# Patient Record
Sex: FEMALE | Race: WHITE | NOT HISPANIC OR LATINO | Employment: FULL TIME | URBAN - METROPOLITAN AREA
[De-identification: names, ages, dates, MRNs, and addresses within clinical notes are randomized per-mention and may not be internally consistent; named-entity substitution may affect disease eponyms.]

---

## 2017-01-13 ENCOUNTER — HOSPITAL ENCOUNTER (EMERGENCY)
Facility: CLINIC | Age: 32
Discharge: HOME | End: 2017-01-13
Attending: EMERGENCY MEDICINE | Admitting: EMERGENCY MEDICINE
Payer: COMMERCIAL

## 2017-01-13 VITALS
SYSTOLIC BLOOD PRESSURE: 108 MMHG | TEMPERATURE: 98.2 F | HEART RATE: 67 BPM | OXYGEN SATURATION: 99 % | WEIGHT: 135 LBS | RESPIRATION RATE: 16 BRPM | BODY MASS INDEX: 23.92 KG/M2 | HEIGHT: 63 IN | DIASTOLIC BLOOD PRESSURE: 53 MMHG

## 2017-01-13 DIAGNOSIS — S05.01XA CORNEAL ABRASION, RIGHT, INITIAL ENCOUNTER: ICD-10-CM

## 2017-01-13 PROCEDURE — 99283 EMERGENCY DEPT VISIT LOW MDM: CPT

## 2017-01-13 PROCEDURE — 25000125 ZZHC RX 250: Performed by: EMERGENCY MEDICINE

## 2017-01-13 RX ORDER — TOBRAMYCIN 3 MG/ML
1 SOLUTION/ DROPS OPHTHALMIC EVERY 4 HOURS
Qty: 1 BOTTLE | Refills: 0 | Status: SHIPPED | OUTPATIENT
Start: 2017-01-13 | End: 2017-01-20

## 2017-01-13 RX ORDER — PROPARACAINE HYDROCHLORIDE 5 MG/ML
2 SOLUTION/ DROPS OPHTHALMIC ONCE
Status: COMPLETED | OUTPATIENT
Start: 2017-01-13 | End: 2017-01-13

## 2017-01-13 RX ADMIN — PROPARACAINE HYDROCHLORIDE 2 DROP: 5 SOLUTION/ DROPS OPHTHALMIC at 08:06

## 2017-01-13 ASSESSMENT — VISUAL ACUITY
OS: 20/15
OD: 20/20

## 2017-01-13 ASSESSMENT — ENCOUNTER SYMPTOMS
EYE REDNESS: 1
PHOTOPHOBIA: 1
EYE PAIN: 1

## 2017-01-13 NOTE — ED AVS SNAPSHOT
Emergency Department    [address] [facility] [address]    Phone:  [phone]    Fax:  [phone]                                       Natacha Martin   MRN: [id number]    Department:   Emergency Department   Date of Visit:  1/13/2017           After Visit Summary Signature Page     I have received my discharge instructions, and my questions have been answered. I have discussed any challenges I see with this plan with the nurse or doctor.    ..........................................................................................................................................  Patient/Patient Representative Signature      ..........................................................................................................................................  Patient Representative Print Name and Relationship to Patient    ..................................................               ................................................  Date                                            Time    ..........................................................................................................................................  Reviewed by Signature/Title    ...................................................              ..............................................  Date                                                            Time

## 2017-01-13 NOTE — DISCHARGE INSTRUCTIONS
Discharge Instructions  Corneal Abrasion    Today you were treated for a scratch on the cornea of your eye, or a corneal abrasion.  The cornea is the clear layer of tissue that covers the colored part of your eye. Corneal abrasions are caused when something scratches your eye such as fingernails, animal paws, branches, pieces of paper, tiny pieces of rust, wood, glass, plastic or contact lenses. Corneal abrasions often make people feel like there is a speck of sand in the eye; these abrasions also can cause severe eye pain, watery eyes, blurred vision and pain with bright light.      Treatment:    Tylenol  (acetaminophen), Motrin  (ibuprofen), or Advil  (ibuprofen) will help with the pain from the abrasion.      Use the antibiotic eye ointment or drops as directed until the antibiotics are finished.    Do not wear contacts until antibiotic is finished.    Do not patch your eye, this can increase your risk for infection.  Your symptoms should improve gradually over the next 2 days, if they are not improving, it is very important that you see an eye doctor right away.  If over the next few days, the pain is getting worse, you have increasing difficulty with vision or you have yellow drainage from your eye, you need to see the eye doctor that day.  If you have difficulty getting in to see an eye doctor, please return to an Urgent Care or Emergency Department for further evaluation and treatment.    If you were given a prescription for medicine here today, be sure to read all of the information (including the package insert) that comes with your prescription.  This will include important information about the medicine, its side effects, and any warnings that you need to know about.  The pharmacist who fills the prescription can provide more information and answer questions you may have about the medicine.  If you have questions or concerns that the pharmacist cannot address, please call or return to the Emergency  Department.   Opioid Medication Information    Pain medications are among the most commonly prescribed medicines, so we are including this information for all our patients. If you did not receive pain medication or get a prescription for pain medicine, you can ignore it.     You may have been given a prescription for an opioid (narcotic) pain medicine and/or have received a pain medicine while here in the Emergency Department. These medicines can make you drowsy or impaired. You must not drive, operate dangerous equipment, or engage in any other dangerous activities while taking these medications. If you drive while taking these medications, you could be arrested for DUI, or driving under the influence. Do not drink any alcohol while you are taking these medications.     Opioid pain medications can cause addiction. If you have a history of chemical dependency of any type, you are at a higher risk of becoming addicted to pain medications.  Only take these prescribed medications to treat your pain when all other options have been tried. Take it for as short a time and as few doses as possible. Store your pain pills in a secure place, as they are frequently stolen and provide a dangerous opportunity for children or visitors in your house to start abusing these powerful medications. We will not replace any lost or stolen medicine.  As soon as your pain is better, you should flush all your remaining medication.     Many prescription pain medications contain Tylenol  (acetaminophen), including Vicodin , Tylenol #3 , Norco , Lortab , and Percocet .  You should not take any extra pills of Tylenol  if you are using these prescription medications or you can get very sick.  Do not ever take more than 3000 mg of acetaminophen in any 24 hour period.    All opioids tend to cause constipation. Drink plenty of water and eat foods that have a lot of fiber, such as fruits, vegetables, prune juice, apple juice and high fiber cereal.   Take a laxative if you don t move your bowels at least every other day. Miralax , Milk of Magnesia, Colace , or Senna  can be used to keep you regular.      Remember that you can always come back to the Emergency Department if you are not able to see your regular doctor in the amount of time listed above, if you get any new symptoms, or if there is anything that worries you.

## 2017-01-13 NOTE — ED PROVIDER NOTES
"  History     Chief Complaint:  Eye pain     HPI   Natacha Martin is a 31 year old female who presents for evaluation of eye pain. The patient states that she woke up yesterday morning and noticed that the light in her bathroom was \"really bright\", so she had to dim them. When she looked in the mirror at her eyes, she saw that her right eye was red and irritated. In the interim, she states that she has had excess tearing. Today, the patient woke up with pain in her right eye. She also thinks that the area surrounding her right eye looks swollen. She denies any contact use. She denies any injury to the right eye. She denies any activities that may have resulted in a foreign body getting into her eye.    Allergies:  Penicillins      Medications:    IUD     Past Medical History:    Anxiety  Alcohol abuse, in remission     Past Surgical History:    History reviewed. No pertinent past surgical history.    Family History:    Depression (mother, brother, sister)  [id number] cancer (father)     Social History:  Presents to the ED with her spouse  Tobacco Use: former smoker  Alcohol Use: no  PCP: Lalitha Hanley     Review of Systems   Eyes: Positive for photophobia, pain and redness.     10 point review of systems performed and is negative except as above and in HPI.      Physical Exam   First Vitals:  BP: 108/53 mmHg  Pulse: 67  Temp: 98.2  F (36.8  C)  Resp: 16  Height: 160 cm (5' 3\")  Weight: 61.236 kg (135 lb)  SpO2: 99 %      Physical Exam  General: Sitting on gurney, appears minimally uncomfortable  Head:  No evidence of trauma  Eyes:  1 mm area of increased fluorescein uptake inferior to the pupil in the right eye. She has   conjunctival injection without exudate or mattering. No dendritis or evidence of foreign   body.   Respiratory:  Regular rate without increased respiratory effort  Extremities:  No evidence of injury  Neuro:  Uses all extremities equally. Normal gait.  Psych:  Alert. Appropriate interactions. "       Emergency Department Course     Interventions:  (0806) Alcaine, 2 drops, right eye    Emergency Department Course:  Nursing notes and vitals reviewed.  I performed an exam of the patient as documented above. GCS 15.    Findings and plan explained to the patient. Patient discharged home with instructions regarding supportive care, medications, and reasons to return. The importance of close follow-up was reviewed. The patient was prescribed tobrex.      Impression & Plan      Medical Decision Making:  Natacha Martin is a 31 year old female who presents for evaluation of eye pain. A broad differential diagnosis was considered including bacterial conjunctivitis, viral conjunctivitis, foreign body, corneal abrasion, chemical vs allergic conjunctivitis, corneal ulcer, HSV, herpes zoster opthalmicus, endopthalmitis, orbital cellulitis, etc.  Exam is consistent with a corneal abrasion. The patient was treated with antibiotics.  Instructed to return for visual acuity changes, fever, orbital swelling or any worsening.      Diagnosis:    ICD-10-CM    1. Corneal abrasion, right, initial encounter S05.01XA        Disposition:  discharged to home      Discharge Medication List as of 1/13/2017  9:03 AM      START taking these medications    Details   tobramycin (TOBREX) 0.3 % ophthalmic solution Apply 1 drop to eye every 4 hours for 7 days, Disp-1 Bottle, R-0, Local Print           Mauri TRINIDAD, am serving as a scribe on 1/13/2017 at 8:13 AM to personally document services performed by Dr. Hubert MD based on my observations and the provider's statements to me.     1/13/2017    EMERGENCY DEPARTMENT        Kimberli Gaspar MD  01/14/17 0841

## 2017-01-13 NOTE — ED AVS SNAPSHOT
Emergency Department    [address] [facility] [address]    Phone:  [phone]    Fax:  [phone]                                       Natacha Martin   MRN: [id number]    Department:   Emergency Department   Date of Visit:  1/13/2017           Patient Information     Date Of Birth          1985        Your diagnoses for this visit were:     Corneal abrasion, right, initial encounter        You were seen by Kimberli Gaspar MD.      Follow-up Information     Follow up with Moe Dsouza MD. Schedule an appointment as soon as possible for a visit in 3 days.    Specialty:  Ophthalmology    Contact information:    [facility]  [address]  [facility] [address] [phone]          Follow up with  Emergency Department.    Specialty:  EMERGENCY MEDICINE    Why:  As needed    Contact information:    [address] [facility] [address] [phone]        Discharge Instructions       Discharge Instructions  Corneal Abrasion    Today you were treated for a scratch on the cornea of your eye, or a corneal abrasion.  The cornea is the clear layer of tissue that covers the colored part of your eye. Corneal abrasions are caused when something scratches your eye such as fingernails, animal paws, branches, pieces of paper, tiny pieces of rust, wood, glass, plastic or contact lenses. Corneal abrasions often make people feel like there is a speck of sand in the eye; these abrasions also can cause severe eye pain, watery eyes, blurred vision and pain with bright light.      Treatment:    Tylenol  (acetaminophen), Motrin  (ibuprofen), or Advil  (ibuprofen) will help with the pain from the abrasion.      Use the antibiotic eye ointment or drops as directed until the antibiotics are finished.    Do not wear contacts until antibiotic is finished.    Do not patch your eye, this can increase your risk for infection.  Your symptoms should improve gradually  over the next 2 days, if they are not improving, it is very important that you see an eye doctor right away.  If over the next few days, the pain is getting worse, you have increasing difficulty with vision or you have yellow drainage from your eye, you need to see the eye doctor that day.  If you have difficulty getting in to see an eye doctor, please return to an Urgent Care or Emergency Department for further evaluation and treatment.    If you were given a prescription for medicine here today, be sure to read all of the information (including the package insert) that comes with your prescription.  This will include important information about the medicine, its side effects, and any warnings that you need to know about.  The pharmacist who fills the prescription can provide more information and answer questions you may have about the medicine.  If you have questions or concerns that the pharmacist cannot address, please call or return to the Emergency Department.   Opioid Medication Information    Pain medications are among the most commonly prescribed medicines, so we are including this information for all our patients. If you did not receive pain medication or get a prescription for pain medicine, you can ignore it.     You may have been given a prescription for an opioid (narcotic) pain medicine and/or have received a pain medicine while here in the Emergency Department. These medicines can make you drowsy or impaired. You must not drive, operate dangerous equipment, or engage in any other dangerous activities while taking these medications. If you drive while taking these medications, you could be arrested for DUI, or driving under the influence. Do not drink any alcohol while you are taking these medications.     Opioid pain medications can cause addiction. If you have a history of chemical dependency of any type, you are at a higher risk of becoming addicted to pain medications.  Only take these prescribed  medications to treat your pain when all other options have been tried. Take it for as short a time and as few doses as possible. Store your pain pills in a secure place, as they are frequently stolen and provide a dangerous opportunity for children or visitors in your house to start abusing these powerful medications. We will not replace any lost or stolen medicine.  As soon as your pain is better, you should flush all your remaining medication.     Many prescription pain medications contain Tylenol  (acetaminophen), including Vicodin , Tylenol #3 , Norco , Lortab , and Percocet .  You should not take any extra pills of Tylenol  if you are using these prescription medications or you can get very sick.  Do not ever take more than 3000 mg of acetaminophen in any 24 hour period.    All opioids tend to cause constipation. Drink plenty of water and eat foods that have a lot of fiber, such as fruits, vegetables, prune juice, apple juice and high fiber cereal.  Take a laxative if you don t move your bowels at least every other day. Miralax , Milk of Magnesia, Colace , or Senna  can be used to keep you regular.      Remember that you can always come back to the Emergency Department if you are not able to see your regular doctor in the amount of time listed above, if you get any new symptoms, or if there is anything that worries you.        24 Hour Appointment Hotline       To make an appointment at any [facility], call [phone] ([phone]). If you don't have a family doctor or clinic, we will help you find one. [facility] clinics are conveniently located to serve the needs of you and your family.             Review of your medicines      START taking        Dose / Directions Last dose taken    tobramycin 0.3 % ophthalmic solution   Commonly known as:  TOBREX   Dose:  1 drop   Quantity:  1 Bottle        Apply 1 drop to eye every 4 hours for 7 days   Refills:  0          Our records show that you are taking the  medicines listed below. If these are incorrect, please call your family doctor or clinic.        Dose / Directions Last dose taken    IUD's Iud        by Intrauterine route.   Refills:  0                Prescriptions were sent or printed at these locations (1 Prescription)                   Other Prescriptions                Printed at Department/Unit printer (1 of 1)         tobramycin (TOBREX) 0.3 % ophthalmic solution                Orders Needing Specimen Collection     None      Pending Results     No orders found from 1/12/2017 to 1/14/2017.            Pending Culture Results     No orders found from 1/12/2017 to 1/14/2017.             Test Results from your hospital stay            Clinical Quality Measure: Blood Pressure Screening     Your blood pressure was checked while you were in the emergency department today. The last reading we obtained was  BP: 108/53 mmHg . Please read the guidelines below about what these numbers mean and what you should do about them.  If your systolic blood pressure (the top number) is less than 120 and your diastolic blood pressure (the bottom number) is less than 80, then your blood pressure is normal. There is nothing more that you need to do about it.  If your systolic blood pressure (the top number) is 120-139 or your diastolic blood pressure (the bottom number) is 80-89, your blood pressure may be higher than it should be. You should have your blood pressure rechecked within a year by a primary care provider.  If your systolic blood pressure (the top number) is 140 or greater or your diastolic blood pressure (the bottom number) is 90 or greater, you may have high blood pressure. High blood pressure is treatable, but if left untreated over time it can put you at risk for heart attack, stroke, or kidney failure. You should have your blood pressure rechecked by a primary care provider within the next 4 weeks.  If your provider in the emergency department today gave you  "specific instructions to follow-up with your doctor or provider even sooner than that, you should follow that instruction and not wait for up to 4 weeks for your follow-up visit.        Thank you for choosing [facility]       Thank you for choosing [facility] for your care. Our goal is always to provide you with excellent care. Hearing back from our patients is one way we can continue to improve our services. Please take a few minutes to complete the written survey that you may receive in the mail after you visit with us. Thank you!        [employer] Information     [employer] lets you send messages to your doctor, view your test results, renew your prescriptions, schedule appointments and more. To sign up, go to [email or web address] . Click on \"Log in\" on the left side of the screen, which will take you to the Welcome page. Then click on \"Sign up Now\" on the right side of the page.     You will be asked to enter the access code listed below, as well as some personal information. Please follow the directions to create your username and password.     Your access code is: [email or web address]  Expires: 2017  9:03 AM     Your access code will  in 90 days. If you need help or a new code, please call your [facility] clinic or [phone].        Care EveryWhere ID     This is your Care EveryWhere ID. This could be used by other organizations to access your [facility] medical records  [id number]        After Visit Summary       This is your record. Keep this with you and show to your community pharmacist(s) and doctor(s) at your next visit.                  "

## 2022-03-24 ENCOUNTER — TRANSCRIBE ORDERS (OUTPATIENT)
Dept: MATERNAL FETAL MEDICINE | Facility: CLINIC | Age: 37
End: 2022-03-24
Payer: COMMERCIAL

## 2022-03-24 DIAGNOSIS — O26.90 PREGNANCY RELATED CONDITION: Primary | ICD-10-CM

## 2022-04-04 ENCOUNTER — PRE VISIT (OUTPATIENT)
Dept: MATERNAL FETAL MEDICINE | Facility: CLINIC | Age: 37
End: 2022-04-04
Payer: COMMERCIAL

## 2022-04-11 ENCOUNTER — OFFICE VISIT (OUTPATIENT)
Dept: MATERNAL FETAL MEDICINE | Facility: CLINIC | Age: 37
End: 2022-04-11
Attending: OBSTETRICS & GYNECOLOGY
Payer: COMMERCIAL

## 2022-04-11 ENCOUNTER — HOSPITAL ENCOUNTER (OUTPATIENT)
Dept: ULTRASOUND IMAGING | Facility: CLINIC | Age: 37
Discharge: HOME | End: 2022-04-11
Attending: OBSTETRICS & GYNECOLOGY
Payer: COMMERCIAL

## 2022-04-11 DIAGNOSIS — O09.522 MULTIGRAVIDA OF ADVANCED MATERNAL AGE IN SECOND TRIMESTER: Primary | ICD-10-CM

## 2022-04-11 DIAGNOSIS — O26.90 PREGNANCY RELATED CONDITION: ICD-10-CM

## 2022-04-11 PROCEDURE — 76811 OB US DETAILED SNGL FETUS: CPT | Mod: 26 | Performed by: OBSTETRICS & GYNECOLOGY

## 2022-04-11 PROCEDURE — 76811 OB US DETAILED SNGL FETUS: CPT

## 2022-04-11 NOTE — PROGRESS NOTES
The patient was seen for an ultrasound in the [facility] at the [facility] today.  For a detailed report of the ultrasound examination, please see the ultrasound report which can be found under the imaging tab.    Natacha Colvin MD  , OB/GYN  Maternal-Fetal Medicine  [phone] (Pager)

## 2022-08-25 ENCOUNTER — HOSPITAL ENCOUNTER (OUTPATIENT)
Age: 37
End: 2022-08-25
Attending: OBSTETRICS & GYNECOLOGY | Admitting: OBSTETRICS & GYNECOLOGY
Payer: COMMERCIAL